# Patient Record
Sex: FEMALE | URBAN - NONMETROPOLITAN AREA
[De-identification: names, ages, dates, MRNs, and addresses within clinical notes are randomized per-mention and may not be internally consistent; named-entity substitution may affect disease eponyms.]

---

## 2021-03-01 DIAGNOSIS — Z23 ENCOUNTER FOR IMMUNIZATION: ICD-10-CM

## 2022-06-20 ENCOUNTER — DOCTOR'S OFFICE (OUTPATIENT)
Dept: URBAN - NONMETROPOLITAN AREA CLINIC 1 | Facility: CLINIC | Age: 62
Setting detail: OPHTHALMOLOGY
End: 2022-06-20
Payer: COMMERCIAL

## 2022-06-20 DIAGNOSIS — H25.013: ICD-10-CM

## 2022-06-20 DIAGNOSIS — H43.811: ICD-10-CM

## 2022-06-20 DIAGNOSIS — H35.373: ICD-10-CM

## 2022-06-20 DIAGNOSIS — H25.13: ICD-10-CM

## 2022-06-20 PROCEDURE — 92134 CPTRZ OPH DX IMG PST SGM RTA: CPT | Performed by: OPHTHALMOLOGY

## 2022-06-20 PROCEDURE — 92004 COMPRE OPH EXAM NEW PT 1/>: CPT | Performed by: OPHTHALMOLOGY

## 2022-06-20 ASSESSMENT — REFRACTION_CURRENTRX
OS_ADD: +1.75
OD_CYLINDER: -0.75
OD_AXIS: 168
OD_SPHERE: -1.25
OD_ADD: +1.75
OS_SPHERE: +0.50
OD_OVR_VA: 20/
OD_VPRISM_DIRECTION: PROGS
OS_AXIS: 144
OS_OVR_VA: 20/
OS_CYLINDER: -0.75
OS_VPRISM_DIRECTION: PROGS

## 2022-06-20 ASSESSMENT — REFRACTION_AUTOREFRACTION
OD_SPHERE: +0.25
OS_SPHERE: +1.00
OS_AXIS: 28
OS_CYLINDER: -3.00
OD_AXIS: 175
OD_CYLINDER: -2.00

## 2022-06-20 ASSESSMENT — AXIALLENGTH_DERIVED
OS_AL: 22.3828
OD_AL: 22.681

## 2022-06-20 ASSESSMENT — VISUAL ACUITY
OD_BCVA: 20/40-2
OS_BCVA: 20/25-2

## 2022-06-20 ASSESSMENT — KERATOMETRY
OD_AXISANGLE_DEGREES: 66
OS_AXISANGLE_DEGREES: 119
OS_K2POWER_DIOPTERS: 49.00
OD_K2POWER_DIOPTERS: 47.50
OS_K1POWER_DIOPTERS: 46.00
OD_K1POWER_DIOPTERS: 46.25

## 2022-06-20 ASSESSMENT — SPHEQUIV_DERIVED
OS_SPHEQUIV: -0.5
OD_SPHEQUIV: -0.75

## 2022-06-20 ASSESSMENT — CONFRONTATIONAL VISUAL FIELD TEST (CVF)
OD_FINDINGS: FULL
OS_FINDINGS: FULL

## 2022-11-16 ENCOUNTER — OFFICE VISIT (OUTPATIENT)
Dept: URGENT CARE | Facility: CLINIC | Age: 62
End: 2022-11-16

## 2022-11-16 VITALS
WEIGHT: 185 LBS | OXYGEN SATURATION: 93 % | BODY MASS INDEX: 31.58 KG/M2 | RESPIRATION RATE: 20 BRPM | SYSTOLIC BLOOD PRESSURE: 129 MMHG | HEART RATE: 92 BPM | TEMPERATURE: 98.2 F | DIASTOLIC BLOOD PRESSURE: 85 MMHG | HEIGHT: 64 IN

## 2022-11-16 DIAGNOSIS — H10.32 ACUTE BACTERIAL CONJUNCTIVITIS OF LEFT EYE: Primary | ICD-10-CM

## 2022-11-16 RX ORDER — TRAZODONE HYDROCHLORIDE 50 MG/1
TABLET ORAL
COMMUNITY
Start: 2022-10-13

## 2022-11-16 RX ORDER — ATORVASTATIN CALCIUM 10 MG/1
TABLET, FILM COATED ORAL
COMMUNITY
Start: 2022-11-02

## 2022-11-16 RX ORDER — LISINOPRIL 10 MG/1
TABLET ORAL
COMMUNITY
Start: 2022-11-01

## 2022-11-16 RX ORDER — POLYMYXIN B SULFATE AND TRIMETHOPRIM 1; 10000 MG/ML; [USP'U]/ML
1 SOLUTION OPHTHALMIC EVERY 6 HOURS
Qty: 10 ML | Refills: 0 | Status: SHIPPED | OUTPATIENT
Start: 2022-11-16 | End: 2022-11-23

## 2022-11-16 NOTE — PATIENT INSTRUCTIONS
Use eye drop as directed  Please discard of any item that comes into contact with your eye  Change out pillow case and wash cloths in 24 hours

## 2022-11-16 NOTE — PROGRESS NOTES
[facility]        NAME: Anthony Lamb is a 57 y o  female  : 1960    MRN: [id number]  DATE: 2022  TIME: 4:30 PM    Assessment and Plan   Acute bacterial conjunctivitis of left eye [H10 32]  1  Acute bacterial conjunctivitis of left eye  polymyxin b-trimethoprim (POLYTRIM) ophthalmic solution        Will treat bacterial conjunctivitis with Polytrim eyedrops  Patient does not or contact lenses  Patient Instructions     Use eye drop as directed  Please discard of any item that comes into contact with your eye  Change out pillow case and wash cloths in 24 hours  Follow up with PCP in 3-5 days  Proceed to  ER if symptoms worsen  Chief Complaint     Chief Complaint   Patient presents with   • Conjunctivitis         History of Present Illness       Patient is a 57year old female who presents to the office today for left eye irritation and drainage  States that Monday it started with redness, she has been using OTC pink eye drops  Today was pasted shut  Drainage was yellow in color this morning  Denies changes in vision  Does not wear contacts  Denies pain in the eye  Eye is not itchy  Review of Systems   Review of Systems   Constitutional: Negative for chills and fever  HENT: Negative for sore throat  Eyes: Positive for discharge, redness and itching  Negative for photophobia, pain and visual disturbance  Respiratory: Negative for cough, shortness of breath and wheezing  All other systems reviewed and are negative          Current Medications       Current Outpatient Medications:   •  atorvastatin (LIPITOR) 10 mg tablet, , Disp: , Rfl:   •  lisinopril (ZESTRIL) 10 mg tablet, , Disp: , Rfl:   •  polymyxin b-trimethoprim (POLYTRIM) ophthalmic solution, Administer 1 drop into the left eye every 6 (six) hours for 7 days, Disp: 10 mL, Rfl: 0  •  traZODone (DESYREL) 50 mg tablet, , Disp: , Rfl:     Current Allergies     Allergies as of 2022   • (No Known Allergies)            The following portions of the patient's history were reviewed and updated as appropriate: allergies, current medications, past family history, past medical history, past social history, past surgical history and problem list      Past Medical History:   Diagnosis Date   • Hyperlipidemia    • Hypertension        Past Surgical History:   Procedure Laterality Date   • EYE SURGERY         History reviewed  No pertinent family history  Medications have been verified  Objective   /85   Pulse 92   Temp 98 2 °F (36 8 °C)   Resp 20   Ht 5' 4" (1 626 m)   Wt 83 9 kg (185 lb)   SpO2 93%   BMI 31 76 kg/m²        Physical Exam     Physical Exam  Vitals and nursing note reviewed  Constitutional:       Appearance: Normal appearance  She is normal weight  Eyes:      General: Lids are normal  Lids are everted, no foreign bodies appreciated  Vision grossly intact  Gaze aligned appropriately  No allergic shiner, visual field deficit or scleral icterus  Left eye: No discharge  Extraocular Movements: Extraocular movements intact  Conjunctiva/sclera:      Left eye: Left conjunctiva is injected  Chemosis present  Pupils: Pupils are equal, round, and reactive to light  Right eye: Pupil is round, reactive and not sluggish  Left eye: Pupil is round, reactive and not sluggish  Visual Fields: Right eye visual fields normal and left eye visual fields normal    Cardiovascular:      Rate and Rhythm: Normal rate  Pulses: Normal pulses  Pulmonary:      Effort: Pulmonary effort is normal    Neurological:      Mental Status: She is alert

## 2022-11-16 NOTE — LETTER
November 16, 2022     Patient: Ashli Sanders   YOB: 1960   Date of Visit: 11/16/2022       To Whom It May Concern: It is my medical opinion that [facility] may return to work on 11/17/2022  If you have any questions or concerns, please don't hesitate to call           Sincerely,        [facility] [address]    CC: No Recipients

## 2023-12-14 ENCOUNTER — OFFICE VISIT (OUTPATIENT)
Dept: URGENT CARE | Facility: CLINIC | Age: 63
End: 2023-12-14
Payer: COMMERCIAL

## 2023-12-14 VITALS
SYSTOLIC BLOOD PRESSURE: 139 MMHG | HEART RATE: 86 BPM | WEIGHT: 186 LBS | BODY MASS INDEX: 31.93 KG/M2 | OXYGEN SATURATION: 94 % | RESPIRATION RATE: 16 BRPM | TEMPERATURE: 98.3 F | DIASTOLIC BLOOD PRESSURE: 79 MMHG

## 2023-12-14 DIAGNOSIS — H10.32 ACUTE BACTERIAL CONJUNCTIVITIS OF LEFT EYE: Primary | ICD-10-CM

## 2023-12-14 PROCEDURE — 99213 OFFICE O/P EST LOW 20 MIN: CPT | Performed by: PHYSICIAN ASSISTANT

## 2023-12-14 RX ORDER — CIPROFLOXACIN HYDROCHLORIDE 3.5 MG/ML
1 SOLUTION/ DROPS TOPICAL
Qty: 5 ML | Refills: 0 | Status: SHIPPED | OUTPATIENT
Start: 2023-12-14

## 2023-12-14 NOTE — PROGRESS NOTES
[facility]        NAME: Romaine Rojas is a 70 y.o. female  : 1960    MRN: [id number]  DATE: 2023  TIME: 2:46 PM    Assessment and Plan   Acute bacterial conjunctivitis of left eye [H10.32]  1. Acute bacterial conjunctivitis of left eye  ciprofloxacin (CILOXAN) 0.3 % ophthalmic solution            Patient Instructions     Patient Instructions   Conjunctivitis   WHAT YOU NEED TO KNOW:   Conjunctivitis, or pink eye, is inflammation of your conjunctiva. The conjunctiva is a thin tissue that covers the front of your eye and the back of your eyelid. The conjunctiva helps protect your eye and keep it moist. The most common cause of conjunctivitis is infection with bacteria or a virus. Allergies or exposure to a chemical may also cause conjunctivitis. Conjunctivitis is easily spread from person to person. DISCHARGE INSTRUCTIONS:   Return to the emergency department if:   You have worsening eye pain. The swelling in your eye gets worse, even after treatment. Your vision suddenly becomes worse, or you cannot see at all. Call your doctor if:   Your start to notice changes in your vision. You develop a fever and ear pain. You have tiny bumps or spots of blood on your eye. You have questions or concerns about your condition or care. Medicines: You may need any of the following: Allergy medicine  helps decrease itchy, red, swollen eyes caused by allergies. It may be given as a pill, eye drops, or nasal spray. Antibiotics  may be needed if your conjunctivitis is caused by bacteria. This medicine may be given as a pill, eye drops, or eye ointment. Take your medicine as directed. Contact your healthcare provider if you think your medicine is not helping or if you have side effects. Tell your provider if you are allergic to any medicine. Keep a list of the medicines, vitamins, and herbs you take. Include the amounts, and when and why you take them.  Bring the list or the pill bottles to follow-up visits. Carry your medicine list with you in case of an emergency. Manage your symptoms:   Apply a cool compress. Wet a washcloth with cold water and place it on your eye. This will help decrease itching and irritation. Use artificial tears. This will help lessen your symptoms, including itching or irritation. Do not wear contact lenses  until treatment is complete and your symptoms are gone. Flush your eye. You may need to flush your eye with saline to help decrease your symptoms. Ask for more information on how to flush your eye. Prevent the spread of conjunctivitis:   Wash your hands with soap and water often. Wash your hands before and after you touch your eyes. Wash your hands after you use the bathroom, change a child's diaper, or sneeze. Wash your hands before you prepare or eat food. Avoid contact with others. Do not share towels or washcloths. Try to stay away from others as much as possible. Ask when you can return to work or school. Avoid allergens and irritants. Try to avoid the things that cause your allergies, such as pets, dust, or grass. Stay away from smoke filled areas. Shield your eyes from wind and sun. Throw away eye makeup. Bacteria can stay in eye makeup. Throw away your current mascara and other eye makeup. Never share mascara or other eye makeup with anyone. Follow up with your doctor as directed: You may be referred to an ophthalmologist for treatment. Write down your questions so you remember to ask them during your visits. © Copyright Dony Gutiérrez 2023 Information is for End User's use only and may not be sold, redistributed or otherwise used for commercial purposes. The above information is an  only. It is not intended as medical advice for individual conditions or treatments.  Talk to your doctor, nurse or pharmacist before following any medical regimen to see if it is safe and effective for you.        Follow up with PCP in 3-5 days. Proceed to  ER if symptoms worsen. Chief Complaint     Chief Complaint   Patient presents with    Conjunctivitis         History of Present Illness       The patient presents the clinic for drainage and redness of the left eye. She states that she has had some upper respiratory symptoms last few days        Review of Systems   Review of Systems   Constitutional:  Negative for activity change, diaphoresis, fatigue, fever and unexpected weight change. HENT:  Positive for congestion, rhinorrhea and sinus pressure. Negative for hearing loss, mouth sores, nosebleeds, postnasal drip, sinus pain, sneezing and sore throat. Eyes:  Positive for pain, discharge, redness and itching. Negative for photophobia and visual disturbance. Neurological:  Negative for dizziness, seizures and headaches. Current Medications       Current Outpatient Medications:     atorvastatin (LIPITOR) 10 mg tablet, , Disp: , Rfl:     ciprofloxacin (CILOXAN) 0.3 % ophthalmic solution, Administer 1 drop into the left eye every 4 (four) hours, Disp: 5 mL, Rfl: 0    lisinopril (ZESTRIL) 10 mg tablet, , Disp: , Rfl:     traZODone (DESYREL) 50 mg tablet, , Disp: , Rfl:     Current Allergies     Allergies as of 12/14/2023    (No Known Allergies)            The following portions of the patient's history were reviewed and updated as appropriate: allergies, current medications, past family history, past medical history, past social history, past surgical history and problem list.     Past Medical History:   Diagnosis Date    Hyperlipidemia     Hypertension        Past Surgical History:   Procedure Laterality Date    EYE SURGERY         History reviewed. No pertinent family history. Medications have been verified.         Objective   /79   Pulse 86   Temp 98.3 °F (36.8 °C)   Resp 16   Wt 84.4 kg (186 lb)   SpO2 94%   BMI 31.93 kg/m²        Physical Exam     Physical Exam  Eyes: General:         Left eye: Discharge present. No foreign body or hordeolum. Extraocular Movements:      Right eye: Normal extraocular motion and no nystagmus. Left eye: Normal extraocular motion and no nystagmus. Conjunctiva/sclera:      Left eye: Left conjunctiva is injected. Chemosis, exudate and hemorrhage present. Pupils:      Left eye: Pupil is round, reactive and not sluggish. No corneal abrasion or fluorescein uptake. Lesley exam negative. Funduscopic exam:        Left eye: No arteriolar narrowing or papilledema. Red reflex present. Comments: -There is edema noted in the left thigh with erythema of the conjunctiva.

## 2023-12-14 NOTE — PATIENT INSTRUCTIONS
Conjunctivitis   WHAT YOU NEED TO KNOW:   Conjunctivitis, or pink eye, is inflammation of your conjunctiva. The conjunctiva is a thin tissue that covers the front of your eye and the back of your eyelid. The conjunctiva helps protect your eye and keep it moist. The most common cause of conjunctivitis is infection with bacteria or a virus. Allergies or exposure to a chemical may also cause conjunctivitis. Conjunctivitis is easily spread from person to person. DISCHARGE INSTRUCTIONS:   Return to the emergency department if:   You have worsening eye pain. The swelling in your eye gets worse, even after treatment. Your vision suddenly becomes worse, or you cannot see at all. Call your doctor if:   Your start to notice changes in your vision. You develop a fever and ear pain. You have tiny bumps or spots of blood on your eye. You have questions or concerns about your condition or care. Medicines: You may need any of the following: Allergy medicine  helps decrease itchy, red, swollen eyes caused by allergies. It may be given as a pill, eye drops, or nasal spray. Antibiotics  may be needed if your conjunctivitis is caused by bacteria. This medicine may be given as a pill, eye drops, or eye ointment. Take your medicine as directed. Contact your healthcare provider if you think your medicine is not helping or if you have side effects. Tell your provider if you are allergic to any medicine. Keep a list of the medicines, vitamins, and herbs you take. Include the amounts, and when and why you take them. Bring the list or the pill bottles to follow-up visits. Carry your medicine list with you in case of an emergency. Manage your symptoms:   Apply a cool compress. Wet a washcloth with cold water and place it on your eye. This will help decrease itching and irritation. Use artificial tears. This will help lessen your symptoms, including itching or irritation.     Do not wear contact lenses  until treatment is complete and your symptoms are gone. Flush your eye. You may need to flush your eye with saline to help decrease your symptoms. Ask for more information on how to flush your eye. Prevent the spread of conjunctivitis:   Wash your hands with soap and water often. Wash your hands before and after you touch your eyes. Wash your hands after you use the bathroom, change a child's diaper, or sneeze. Wash your hands before you prepare or eat food. Avoid contact with others. Do not share towels or washcloths. Try to stay away from others as much as possible. Ask when you can return to work or school. Avoid allergens and irritants. Try to avoid the things that cause your allergies, such as pets, dust, or grass. Stay away from smoke filled areas. Shield your eyes from wind and sun. Throw away eye makeup. Bacteria can stay in eye makeup. Throw away your current mascara and other eye makeup. Never share mascara or other eye makeup with anyone. Follow up with your doctor as directed: You may be referred to an ophthalmologist for treatment. Write down your questions so you remember to ask them during your visits. © Copyright Houstonteresita Henry 2023 Information is for End User's use only and may not be sold, redistributed or otherwise used for commercial purposes. The above information is an  only. It is not intended as medical advice for individual conditions or treatments. Talk to your doctor, nurse or pharmacist before following any medical regimen to see if it is safe and effective for you.

## 2024-10-22 ENCOUNTER — APPOINTMENT (OUTPATIENT)
Dept: RADIOLOGY | Facility: CLINIC | Age: 64
End: 2024-10-22
Payer: COMMERCIAL

## 2024-10-22 ENCOUNTER — OFFICE VISIT (OUTPATIENT)
Dept: URGENT CARE | Facility: CLINIC | Age: 64
End: 2024-10-22
Payer: COMMERCIAL

## 2024-10-22 VITALS
RESPIRATION RATE: 16 BRPM | DIASTOLIC BLOOD PRESSURE: 77 MMHG | TEMPERATURE: 101.6 F | HEART RATE: 98 BPM | OXYGEN SATURATION: 95 % | SYSTOLIC BLOOD PRESSURE: 121 MMHG

## 2024-10-22 DIAGNOSIS — R05.1 ACUTE COUGH: ICD-10-CM

## 2024-10-22 DIAGNOSIS — J06.9 ACUTE RESPIRATORY DISEASE: Primary | ICD-10-CM

## 2024-10-22 LAB
SARS-COV-2 AG UPPER RESP QL IA: NEGATIVE
VALID CONTROL: NORMAL

## 2024-10-22 PROCEDURE — 87811 SARS-COV-2 COVID19 W/OPTIC: CPT

## 2024-10-22 PROCEDURE — S9083 URGENT CARE CENTER GLOBAL: HCPCS

## 2024-10-22 PROCEDURE — G0382 LEV 3 HOSP TYPE B ED VISIT: HCPCS

## 2024-10-22 PROCEDURE — 71046 X-RAY EXAM CHEST 2 VIEWS: CPT

## 2024-10-22 RX ORDER — ALBUTEROL SULFATE 90 UG/1
2 INHALANT RESPIRATORY (INHALATION) EVERY 6 HOURS PRN
Qty: 8.5 G | Refills: 0 | Status: SHIPPED | OUTPATIENT
Start: 2024-10-22

## 2024-10-22 RX ORDER — BENZONATATE 200 MG/1
200 CAPSULE ORAL 3 TIMES DAILY PRN
Qty: 20 CAPSULE | Refills: 0 | Status: SHIPPED | OUTPATIENT
Start: 2024-10-22

## 2024-10-22 NOTE — PROGRESS NOTES
"  [facility]        NAME: Nanda Claros is a 64 y.o. female  : 1960    MRN: [id number]  DATE: 2024  TIME: 5:30 PM    Assessment and Plan   Acute respiratory disease [J06.9]  1. Acute respiratory disease  XR chest pa and lateral    albuterol (ProAir HFA) 90 mcg/act inhaler    benzonatate (TESSALON) 200 MG capsule    Poct Covid 19 Rapid Antigen Test        Cxr negative for pneumonia  Rapid covid negative  Will add albuterol and tessalon for cough    Patient Instructions   Pt appears to have a viral upper respiratory infection. Antibiotics are contraindicated at this time and would not help you feel better faster.     Although the symptoms are troublesome, usually the patient's body is able to recover from a viral infection on an average time of 7-10 days.  Fever, if any, typically resolves after 3-5 days.  If patient has sore throat, typically this resolves within 3-5 days.  Any nasal congestion, runny nose, post nasal drip typically begin to  improve after 7-10 days.  Any cough may linger over a couple weeks.  Please note that having a cough is not necessarily a bad thing.  It often times is part of our body's protective mechanism to help keep our airways clear.      Please note that yellow mucous doesn't necessarily mean a \"bacterial\" infection.  Yellow mucous doesn't automatically mean that an antibiotic is needed.  It is not unusual for mucus to become more discolored in the days after the start of an upper respiratory infection.  Often times this is due to mucous that has thickened  with white blood cells that have flooded the mucosa to try and fight the viral infection.      Ear Pain may occur when the eustachian tubes become blocked with mucous or swollen due to acute inflammation from illness. May give Ibuprofen or Tylenol as needed for comfort.  May also use warm compress against ear for comfort.  If ear ache is persisting and not improving over 2-3 days or if there is any gross " drainage coming from ear, please seek further evaluation.      Natural remedies to help provide comfort for cough/ cold symptoms include: one teaspoon of honey (only in infants over 1 year of age), increased vitamin C (oranges, heidi, etc.), ginger, and drinking plenty of fluids. Vaporizer by bedside.    If you should have prolonged symptoms (Greater than 10 days), worsening symptoms, or any new symptoms please seek further medical attention.    If you would be having difficulty breathing, seek further evaluation by calling 911 or proceeding to ER for further evaluation.     Follow up with PCP in 3-5 days.  Proceed to  ER if symptoms worsen.    Chief Complaint     Chief Complaint   Patient presents with    Cough    Chest congestion          History of Present Illness       Patient is a 64 y.o female who presents to the office today for a cough, congestion, chills and cp since Sunday. 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 Did take some delsym with some relief of the cough.     Cough  Associated symptoms include chest pain, chills and a fever.       Review of Systems   Review of Systems   Constitutional:  Positive for chills and fever.   HENT:  Positive for congestion.    Respiratory:  Positive for cough.    Cardiovascular:  Positive for chest pain.   All other systems reviewed and are negative.        Current Medications       Current Outpatient Medications:     albuterol (ProAir HFA) 90 mcg/act inhaler, Inhale 2 puffs every 6 (six) hours as needed for wheezing or shortness of breath, Disp: 8.5 g, Rfl: 0    atorvastatin (LIPITOR) 10 mg tablet, , Disp: , Rfl:     benzonatate (TESSALON) 200 MG capsule, Take 1 capsule (200 mg total) by mouth 3 (three) times a day as needed for cough, Disp: 20 capsule, Rfl: 0    lisinopril (ZESTRIL) 10 mg tablet, , Disp: , Rfl:     ciprofloxacin (CILOXAN) 0.3 % ophthalmic solution, Administer 1 drop into the left eye every 4 (four) hours,  Disp: 5 mL, Rfl: 0    traZODone (DESYREL) 50 mg tablet, , Disp: , Rfl:     Current Allergies     Allergies as of 10/22/2024    (No Known Allergies)            The following portions of the patient's history were reviewed and updated as appropriate: allergies, current medications, past family history, past medical history, past social history, past surgical history and problem list.     Past Medical History:   Diagnosis Date    Hyperlipidemia     Hypertension        Past Surgical History:   Procedure Laterality Date    EYE SURGERY         History reviewed. No pertinent family history.      Medications have been verified.        Objective   /77   Pulse 98   Temp (!) 101.6 °F (38.7 °C)   Resp 16   SpO2 95%        Physical Exam     Physical Exam  Vitals and nursing note reviewed.   Constitutional:       General: She is not in acute distress.     Appearance: She is ill-appearing. She is not toxic-appearing.   HENT:      Right Ear: Tympanic membrane normal.      Left Ear: Tympanic membrane normal.      Nose: Congestion present.      Mouth/Throat:      Mouth: Mucous membranes are moist.      Pharynx: Posterior oropharyngeal erythema present.   Cardiovascular:      Rate and Rhythm: Normal rate and regular rhythm.      Pulses: Normal pulses.      Heart sounds: Normal heart sounds.   Pulmonary:      Effort: Pulmonary effort is normal.      Breath sounds: Normal breath sounds.   Skin:     General: Skin is warm.      Capillary Refill: Capillary refill takes less than 2 seconds.   Neurological:      General: No focal deficit present.      Mental Status: She is alert and oriented to person, place, and time.

## 2024-10-22 NOTE — PATIENT INSTRUCTIONS
"Pt appears to have a viral upper respiratory infection. Antibiotics are contraindicated at this time and would not help you feel better faster.     Although the symptoms are troublesome, usually the patient's body is able to recover from a viral infection on an average time of 7-10 days.  Fever, if any, typically resolves after 3-5 days.  If patient has sore throat, typically this resolves within 3-5 days.  Any nasal congestion, runny nose, post nasal drip typically begin to  improve after 7-10 days.  Any cough may linger over a couple weeks.  Please note that having a cough is not necessarily a bad thing.  It often times is part of our body's protective mechanism to help keep our airways clear.      Please note that yellow mucous doesn't necessarily mean a \"bacterial\" infection.  Yellow mucous doesn't automatically mean that an antibiotic is needed.  It is not unusual for mucus to become more discolored in the days after the start of an upper respiratory infection.  Often times this is due to mucous that has thickened  with white blood cells that have flooded the mucosa to try and fight the viral infection.      Ear Pain may occur when the eustachian tubes become blocked with mucous or swollen due to acute inflammation from illness. May give Ibuprofen or Tylenol as needed for comfort.  May also use warm compress against ear for comfort.  If ear ache is persisting and not improving over 2-3 days or if there is any gross drainage coming from ear, please seek further evaluation.      Natural remedies to help provide comfort for cough/ cold symptoms include: one teaspoon of honey (only in infants over 1 year of age), increased vitamin C (oranges, heidi, etc.), ginger, and drinking plenty of fluids. Vaporizer by bedside.    If you should have prolonged symptoms (Greater than 10 days), worsening symptoms, or any new symptoms please seek further medical attention.    If you would be having difficulty breathing, seek " further evaluation by calling 911 or proceeding to ER for further evaluation.

## 2024-10-22 NOTE — LETTER
October 22, 2024     Patient: Nanda Claros   YOB: 1960   Date of Visit: 10/22/2024       To Whom it May Concern:    Nanda Claros was seen in my clinic on 10/22/2024. She may return to school on 10/24/2024 .    If you have any questions or concerns, please don't hesitate to call.         Sincerely,          RADHA Pollard        CC: No Recipients